# Patient Record
Sex: MALE | Race: WHITE | NOT HISPANIC OR LATINO | URBAN - NONMETROPOLITAN AREA
[De-identification: names, ages, dates, MRNs, and addresses within clinical notes are randomized per-mention and may not be internally consistent; named-entity substitution may affect disease eponyms.]

---

## 2019-05-01 ENCOUNTER — OFFICE VISIT CONVERTED (OUTPATIENT)
Dept: FAMILY MEDICINE CLINIC | Age: 19
End: 2019-05-01
Attending: NURSE PRACTITIONER

## 2019-07-10 ENCOUNTER — OFFICE VISIT CONVERTED (OUTPATIENT)
Dept: FAMILY MEDICINE CLINIC | Age: 19
End: 2019-07-10
Attending: NURSE PRACTITIONER

## 2020-01-18 ENCOUNTER — OFFICE VISIT CONVERTED (OUTPATIENT)
Dept: FAMILY MEDICINE CLINIC | Age: 20
End: 2020-01-18
Attending: NURSE PRACTITIONER

## 2020-08-10 ENCOUNTER — OFFICE VISIT CONVERTED (OUTPATIENT)
Dept: FAMILY MEDICINE CLINIC | Age: 20
End: 2020-08-10
Attending: NURSE PRACTITIONER

## 2021-05-18 NOTE — PROGRESS NOTES
Pedro Wright 2000     Office/Outpatient Visit    Visit Date: Wed, Jul 10, 2019 02:05 pm    Provider: Gissell Carson N.P. (Assistant: Teena Ceballos MA)    Location: [facility]        Electronically signed by Gissell Carson N.P. on  07/10/2019 04:01:38 PM                             SUBJECTIVE:        CC:     Ryan is a 19 year old White male.  This is a follow-up visit.  lexapro not working         HPI:         Patient to be evaluated for anxiety.  Current treatment includes Lexapro.  He is off from College for the summer.  Has been on Lexapro since May, recently increased form 10 to 20 and has not seen a difference.  He has two brothers.  Had not issues in high school but in college trouble with talking and socializing with friends.  Does not walk to try counseling.  He does some volunteer work with animals.  Does not get exercise.  Just not having interest in anything.       ROS:     CONSTITUTIONAL:  Negative for chills, fatigue, fever, and weight change.      CARDIOVASCULAR:  Negative for chest pain, palpitations, tachycardia, orthopnea, and edema.      RESPIRATORY:  Negative for cough, dyspnea, and hemoptysis.      NEUROLOGICAL:  Negative for dizziness, headaches, paresthesias, and weakness.      PSYCHIATRIC:  Negative for sleep disturbance and suicidal thoughts.          PMH/FMH/SH:     Last Reviewed on 7/10/2019 02:28 PM by Gissell Carson    Past Medical History:             PAST MEDICAL HISTORY     UNREMARKABLE     Hospitalizations: Never         Surgical History:         implants for flat feet 10/4/2014;         Family History:     Father: Hypertension;  Type 2 Diabetes     Mother: Hypertension     Brother(s): Healthy; 2 brother(s) total     Paternal Grandfather:  at age 71;  COPD     Paternal Grandmother: Cause of death was 58;  COPD;  Renal Failure     Maternal Grandfather: Sudden Cardiac Death (  at 48 yrs ); cardiomyopathy     Maternal Grandmother: Healthy          Social History:         Currently attends College. 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              Immunizations:     DTaP 1999     DTaP 1999     DTaP 9/12/2000     DTaP 1999     DTaP 3/20/2003     Hib HbOC (4-dose) 1999     Hib HbOC (4-dose) 9/12/2000     Hib HbOC (4-dose) 1999     Comvax (Hib-HepB) 1999     Hep B (pedi/adol, 3-dose schedule) 1999     Hep B (pedi/adol, 3-dose schedule) 3/14/2000     Pfizer MenB vs. Havrix 2/7/2013     Pfizer MenB vs. Havrix 3/25/2013     Pfizer MenB vs. Havrix 7/11/2013     IPV  Poliovirus, inactivated 1999     IPV  Poliovirus, inactivated 1999     IPV  Poliovirus, inactivated 3/14/2000     IPV  Poliovirus, inactivated 3/20/2003     MMR  (Measles-Mumps-Rubella), live 6/19/2000     MMR  (Measles-Mumps-Rubella), live 3/20/2003     Varicella, live 3/14/2000     Varicella, live 7/8/2010     Prevnar (Pneumococcal PCV 7) 6/19/2000     Prevnar (Pneumococcal PCV 7) 9/12/2000     Fluzone (3 + years dose) 10/10/2012     Influenza, split virus (3+ years dose) 10/31/2011     FluMist 12/7/2010     Flumist Quadrivalent 10/17/2013     Influenza A (H1N1) NASAL, Monovalent Live 11/4/2009     Menactra (Meningococcal MCV4P) 7/8/2010     Tdap (Tetanus, reduced diph, acellular pertussis) 7/8/2010         Allergies:     Last Reviewed on 7/10/2019 02:08 PM by Teena Ceballos      No Known Drug Allergies.         Current Medications:     Last Reviewed on 7/10/2019 02:08 PM by Teena Ceballos    Lexapro 20mg Tablet 1 tab daily         OBJECTIVE:        Vitals:         Current: 7/10/2019 2:09:41 PM    Ht:  5 ft, 11 in;  Wt: 195.2 lbs;  BMI: 27.2    T: 98.6 F (oral);  BP: 122/69 mm Hg (left arm, sitting);  P: 62 bpm (left arm (BP Cuff), sitting)        Exams:      PHYSICAL EXAM:     GENERAL: Vitals recorded well developed, well nourished;  well groomed;  no apparent distress;     RESPIRATORY: normal respiratory rate and pattern with no distress; normal breath sounds with no rales, rhonchi, wheezes or rubs;     CARDIOVASCULAR: normal rate; rhythm is regular;  no systolic murmur;     PSYCHIATRIC:  appropriate affect and demeanor; normal speech pattern; grossly normal memory;         ASSESSMENT           300.02   F41.9  Anxiety              DDx:         ORDERS:         Meds Prescribed:       Wellbutrin XL (Bupropion HCl)  150mg Tablets, Extended Release 1 tab daily  #30 (Thirty) tablet(s) Refills: 1                 PLAN:          Anxiety he starts back to school on August 26, recommend he continue lexapro for the next  2 weeks and he still feels no different to add wellbutrin  mg; recommend regular exercise and counseling (consider stopping lexapro if not improving )         FOLLOW-UP: Advised to call if there is no improvement in 4 week(s).            Prescriptions:       Wellbutrin XL (Bupropion HCl)  150mg Tablets, Extended Release 1 tab daily  #30 (Thirty) tablet(s) Refills: 1             CHARGE CAPTURE           **Please note: ICD descriptions below are intended for billing purposes only and may not represent clinical diagnoses**        Primary Diagnosis:         300.02 Anxiety            F41.9    Anxiety disorder, unspecified              Orders:          09190   Office/outpatient visit; established patient, level 3  (In-House)

## 2021-05-18 NOTE — PROGRESS NOTES
Pedro Wright 2000     Office/Outpatient Visit    Visit Date: Wed, May 1, 2019 03:18 pm    Provider: Gissell Carson N.P. (Assistant: Deja Brooks RN)    Location: [facility]        Electronically signed by Gissell Carson N.P. on  05/01/2019 04:54:42 PM                             SUBJECTIVE:        CC:     Ryan is a 19 year old White male.  This is his first visit to the clinic.  Establish New PCP         HPI:         PHQ-9 Depression Screening: Completed form scanned and in chart; Total Score 3 Alcohol Consumption Screening: Completed form scanned and in chart; Total Score 0         In regard to the anxiety, he has suggestive symptoms but does not currently carry an official diagnosis of anxiety disorder.  His symptom complex includes worrier.  The frequency symptoms is several times per day.  Apparent triggers include public speaking and crowds or public places.  He has had no prior treatment for anxiety.  Family history is pertinent for anxiety in his mother.  He says he first noticed that symptoms were a problem when he went through puberty.  He lives away at college and does well at school.     ROS:     CONSTITUTIONAL:  Negative for chills, fatigue, fever, and weight change.      CARDIOVASCULAR:  Negative for chest pain, palpitations, tachycardia, orthopnea, and edema.      RESPIRATORY:  Negative for cough, dyspnea, and hemoptysis.      NEUROLOGICAL:  Negative for dizziness, headaches, paresthesias, and weakness.      PSYCHIATRIC:  Positive for insomnia.   Negative for suicidal thoughts.  sleep issues sometimes         PMH/FMH/SH:     Last Reviewed on 5/01/2019 03:37 PM by Gissell Carson    Past Medical History:             PAST MEDICAL HISTORY     UNREMARKABLE     Hospitalizations: Never         Surgical History:         implants for flat feet 10/4/2014;         Family History:     Father: Hypertension;  Type 2 Diabetes     Mother: Hypertension     Brother(s): Healthy; 2  brother(s) total     Paternal Grandfather:  at age 71;  COPD     Paternal Grandmother: Cause of death was 58;  COPD;  Renal Failure     Maternal Grandfather: Sudden Cardiac Death (  at 48 yrs ); cardiomyopathy     Maternal Grandmother: Healthy         Social History:         Currently attends College. 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              Immunizations:     DTaP 1999     DTaP 1999     DTaP 2000     DTaP 1999     DTaP 3/20/2003     Hib HbOC (4-dose) 1999     Hib HbOC (4-dose) 2000     Hib HbOC (4-dose) 1999     Comvax (Hib-HepB) 1999     Hep B (pedi/adol, 3-dose schedule) 1999     Hep B (pedi/adol, 3-dose schedule) 3/14/2000     Pfizer MenB vs. Havrix 2013     Pfizer MenB vs. Havrix 3/25/2013     Pfizer MenB vs. Havrix 2013     IPV  Poliovirus, inactivated 1999     IPV  Poliovirus, inactivated 1999     IPV  Poliovirus, inactivated 3/14/2000     IPV  Poliovirus, inactivated 3/20/2003     MMR  (Measles-Mumps-Rubella), live 2000     MMR  (Measles-Mumps-Rubella), live 3/20/2003     Varicella, live 3/14/2000     Varicella, live 2010     Prevnar (Pneumococcal PCV 7) 2000     Prevnar (Pneumococcal PCV 7) 2000     Fluzone (3 + years dose) 10/10/2012     Influenza, split virus (3+ years dose) 10/31/2011     FluMist 2010     Flumist Quadrivalent 10/17/2013     Influenza A (H1N1) NASAL, Monovalent Live 2009     Menactra (Meningococcal MCV4P) 2010     Tdap (Tetanus, reduced diph, acellular pertussis) 2010         Allergies:     Last Reviewed on 2019 03:21 PM by Deja Brooks      No Known Drug Allergies.         Current Medications:     Last Reviewed on 2019 03:21 PM by Deja Brooks    None        OBJECTIVE:         Vitals:         Current: 5/1/2019 3:24:36 PM    Ht:  5 ft, 11 in;  Wt: 196 lbs;  BMI: 27.3    T: 99 F (oral);  BP: 138/75 mm Hg (left arm, sitting);  P: 73 bpm (left arm (BP Cuff), sitting)        Repeat:     4:03:44 PM     BP:   137/90mm Hg (left arm, sitting)         Exams:     PHYSICAL EXAM:     GENERAL: Vitals recorded well developed, well nourished;  well groomed;  no apparent distress;     NECK: carotid exam reveals no bruits;     RESPIRATORY: normal respiratory rate and pattern with no distress; normal breath sounds with no rales, rhonchi, wheezes or rubs;     CARDIOVASCULAR: normal rate; rhythm is regular;  no systolic murmur; no edema;     PSYCHIATRIC: affect/demeanor: anxious;         ASSESSMENT           V79.0   Z13.89  Screening for depression              DDx:     300.02   F41.9  Anxiety              DDx:         ORDERS:         Meds Prescribed:       Lexapro (Escitalopram Oxalate) 10mg Tablet 1 tab daily  #30 (Thirty) tablet(s) Refills: 1         Other Orders:         Depression screen negative  (In-House)           Negative EtOH screen  (In-House)                   PLAN:          Screening for depression     MIPS PHQ-9 Depression Screening Completed form scanned and in chart; Total Score 3 Negative alcohol screen           Orders:         Depression screen negative  (In-House)           Negative EtOH screen  (In-House)            Anxiety discussion/ trial of lexapro         RECOMMENDATIONS given include: stress reduction and counseling.      FOLLOW-UP: Advised to call if there is no improvement in 3-4 week(s).            Prescriptions:       Lexapro (Escitalopram Oxalate) 10mg Tablet 1 tab daily  #30 (Thirty) tablet(s) Refills: 1             Patient Recommendations:        For  Anxiety:     Try stress reduction methods to reduce the frequency or lessen the severity of anxiety episodes.              CHARGE CAPTURE           **Please note: ICD descriptions below are intended for  billing purposes only and may not represent clinical diagnoses**        Primary Diagnosis:         V79.0 Screening for depression            Z13.89    Encounter for screening for other disorder              Orders:          42588   Office visit - new pt, level 2  (In-House)                Depression screen negative  (In-House)                Negative EtOH screen  (In-House)           300.02 Anxiety            F41.9    Anxiety disorder, unspecified

## 2021-05-18 NOTE — PROGRESS NOTES
Pedro Wright  2000     Office/Outpatient Visit    Visit Date: Mon, Aug 10, 2020 02:54 pm    Provider: Gissell Carson N.P. (Assistant: Lady Marie MA)    Location: [facility]        Electronically signed by Gissell Carson N.P. on  08/10/2020 05:08:54 PM                             Subjective:        CC: Ryan is a 20 year old White male.  Patient is here today to discuss doing online classes oppose to in person due to covid 19         HPI:           Patient to be evaluated for anxiety disorder, unspecified.  Family history is pertinent for heart valve disorder in brother, he has an upcoing heart surgery.  Not on any medication, felt like rx did not help.  Is talking to a new girl, that makes him more anxious.   Not working. Worry about pressure to go back to school to the classroom, wants to do on line school.      ROS:     CONSTITUTIONAL:  Negative for fever.      CARDIOVASCULAR:  Negative for chest pain.      RESPIRATORY:  Negative for recent cough and dyspnea.  history of asthma     GASTROINTESTINAL:  Negative for abdominal pain, heartburn, constipation, diarrhea, and stool changes.      NEUROLOGICAL:  Negative for dizziness, headaches, paresthesias, and weakness.          Past Medical History / Family History / Social History:         Last Reviewed on 8/10/2020 03:03 PM by Gissell Carson    Past Medical History:             PAST MEDICAL HISTORY     UNREMARKABLE     Hospitalizations: Never         Surgical History:         implants for flat feet 10/4/2014;         Family History:     Father: Hypertension;  Type 2 Diabetes     Mother: Hypertension     Brother(s): 2 brother(s) total;  valve disorder, may need heart surgery     Paternal Grandfather:  at age 71;  COPD     Paternal Grandmother: Cause of death was 58;  COPD;  Renal Failure     Maternal Grandfather: Sudden Cardiac Death (  at 48 yrs ); cardiomyopathy     Maternal Grandmother: Healthy         Social History:          Currently attends College. 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          Immunizations:     DTaP 1999    DTaP 1999    DTaP 9/12/2000    DTaP 1999    DTaP 3/20/2003    Hib HbOC (4-dose) 1999    Hib HbOC (4-dose) 9/12/2000    Hib HbOC (4-dose) 1999    Comvax (Hib-HepB) 1999    Hep B (pedi/adol, 3-dose schedule) 1999    Hep B (pedi/adol, 3-dose schedule) 3/14/2000    Pfizer MenB vs. Havrix 2/7/2013    Pfizer MenB vs. Havrix 3/25/2013    Pfizer MenB vs. Havrix 7/11/2013    IPV  Poliovirus, inactivated 1999    IPV  Poliovirus, inactivated 1999    IPV  Poliovirus, inactivated 3/14/2000    IPV  Poliovirus, inactivated 3/20/2003    MMR  (Measles-Mumps-Rubella), live 6/19/2000    MMR  (Measles-Mumps-Rubella), live 3/20/2003    Varicella, live 3/14/2000    Varicella, live 7/8/2010    Prevnar (Pneumococcal PCV 7) 6/19/2000    Prevnar (Pneumococcal PCV 7) 9/12/2000    Fluzone (3 + years dose) 10/10/2012    Influenza, split virus (3+ years dose) 10/31/2011    FluMist 12/7/2010    Flumist Quadrivalent 10/17/2013    Influenza A (H1N1) NASAL, Monovalent Live 11/4/2009    Menactra (Meningococcal MCV4P) 7/8/2010    Tdap (Tetanus, reduced diph, acellular pertussis) 7/8/2010        Allergies:     Last Reviewed on 8/10/2020 02:55 PM by Lady Marie    No Known Allergies.        Current Medications:     Last Reviewed on 8/10/2020 02:55 PM by Lady Marie    None        Objective:        Vitals:         Current: 8/10/2020 2:57:51 PM    Ht:  5 ft, 11 in;  Wt: 215.4 lbs;  BMI: 30.0T: 98.2 F (temporal);  BP: 143/77 mm Hg (right arm, sitting);  P: 84 bpm (right arm (BP Cuff), sitting)        Exams:     PHYSICAL EXAM:     GENERAL: Vitals recorded well developed, well nourished;  well groomed;  no  apparent distress;     NECK: carotid exam reveals no bruits;     RESPIRATORY: normal respiratory rate and pattern with no distress; normal breath sounds with no rales, rhonchi, wheezes or rubs;     CARDIOVASCULAR: normal rate; rhythm is regular;  no systolic murmur; no edema;     PSYCHIATRIC: affect/demeanor: anxious;         Assessment:         F41.9   Anxiety disorder, unspecified       J45.909   Unspecified asthma, uncomplicated       Z82.49   Family history of ischemic heart disease and other diseases of the circulatory system           Plan:         Anxiety disorder, unspecifieddeclines treatment at this time         RECOMMENDATIONS given include: stress reduction.      FOLLOW-UP: Advised to call if there is no improvement in 4 week(s).          Unspecified asthma, uncomplicatedI advise at this time for him to do on line classes        Family history of ischemic heart disease and other diseases of the circulatory systemdue to his brother's heart condition, exposure to COVID would not be advised             Patient Recommendations:        For  Anxiety disorder, unspecified:    Try stress reduction methods to reduce the frequency or lessen the severity of anxiety episodes.              Charge Capture:         Primary Diagnosis:     F41.9  Anxiety disorder, unspecified           Orders:      40622  Office/outpatient visit; established patient, level 4  (In-House)              J45.909  Unspecified asthma, uncomplicated     Z82.49  Family history of ischemic heart disease and other diseases of the circulatory system

## 2021-05-18 NOTE — PROGRESS NOTES
Pedro Wright  2000     Office/Outpatient Visit    Visit Date: Sat, 2020 09:21 am    Provider: Gissell Carson N.P. (Assistant: Marisol Brewer, )    Location: [facility]        Electronically signed by Gissell Carson N.P. on  2020 11:19:50 AM                             Subjective:        CC: Ryan is a 19 year old White male.  This is a follow-up visit.  med refills         HPI:           Ryan presents with anxiety disorder, unspecified.  Apparent triggers include public speaking and being around people.  Current treatment includes Wellbutrin XL.  he was on lexapro, but never took the two together for more than 5-7 days, can't tell that either rx have helped him, he is in college, does okay at school, lives on campus, but is not social     ROS:     CONSTITUTIONAL:  Negative for chills, fatigue, fever, and weight change.      CARDIOVASCULAR:  Negative for chest pain, palpitations, tachycardia, orthopnea, and edema.      RESPIRATORY:  Negative for cough, dyspnea, and hemoptysis.      NEUROLOGICAL:  Negative for dizziness, headaches, paresthesias, and weakness.      PSYCHIATRIC:  Negative for sleep disturbance and suicidal thoughts.          Past Medical History / Family History / Social History:         Last Reviewed on 2020 09:28 AM by Gissell Carson    Past Medical History:             PAST MEDICAL HISTORY     UNREMARKABLE     Hospitalizations: Never         Surgical History:         implants for flat feet 10/4/2014;         Family History:     Father: Hypertension;  Type 2 Diabetes     Mother: Hypertension     Brother(s): Healthy; 2 brother(s) total     Paternal Grandfather:  at age 71;  COPD     Paternal Grandmother: Cause of death was 58;  COPD;  Renal Failure     Maternal Grandfather: Sudden Cardiac Death (  at 48 yrs ); cardiomyopathy     Maternal Grandmother: Healthy         Social History:         Currently attends College. ( [employer];   ; field of study is Zoology )     Marital Status: Single     Children: None         Tobacco/Alcohol/Supplements:     Last Reviewed on 1/18/2020 09:24 AM by Marisol Brewer    Tobacco: He has never smoked.          Immunizations:     DTaP 1999    DTaP 1999    DTaP 9/12/2000    DTaP 1999    DTaP 3/20/2003    Hib HbOC (4-dose) 1999    Hib HbOC (4-dose) 9/12/2000    Hib HbOC (4-dose) 1999    Comvax (Hib-HepB) 1999    Hep B (pedi/adol, 3-dose schedule) 1999    Hep B (pedi/adol, 3-dose schedule) 3/14/2000    Pfizer MenB vs. Havrix 2/7/2013    Pfizer MenB vs. Havrix 3/25/2013    Pfizer MenB vs. Havrix 7/11/2013    IPV  Poliovirus, inactivated 1999    IPV  Poliovirus, inactivated 1999    IPV  Poliovirus, inactivated 3/14/2000    IPV  Poliovirus, inactivated 3/20/2003    MMR  (Measles-Mumps-Rubella), live 6/19/2000    MMR  (Measles-Mumps-Rubella), live 3/20/2003    Varicella, live 3/14/2000    Varicella, live 7/8/2010    Prevnar (Pneumococcal PCV 7) 6/19/2000    Prevnar (Pneumococcal PCV 7) 9/12/2000    Fluzone (3 + years dose) 10/10/2012    Influenza, split virus (3+ years dose) 10/31/2011    FluMist 12/7/2010    Flumist Quadrivalent 10/17/2013    Influenza A (H1N1) NASAL, Monovalent Live 11/4/2009    Menactra (Meningococcal MCV4P) 7/8/2010    Tdap (Tetanus, reduced diph, acellular pertussis) 7/8/2010        Allergies:     Last Reviewed on 1/18/2020 09:24 AM by Marisol Brewer    No Known Allergies.        Current Medications:     Last Reviewed on 1/18/2020 09:24 AM by Marisol Brewer    buPROPion  mg oral Tablet, Extended Release 24 hr [TAKE 1 TABLET BY MOUTH ONCE DAILY]        Objective:        Vitals:         Current: 1/18/2020 9:26:11 AM    Ht:  5 ft, 11 in;  Wt: 217.4 lbs;  BMI: 30.3T: 97.8 F (oral);  BP: 137/81 mm Hg (left arm, sitting);  P: 70 bpm (left arm (BP Cuff), sitting)        Exams:     PHYSICAL EXAM:     GENERAL: Vitals recorded well  developed, well nourished;  well groomed;  no apparent distress;     RESPIRATORY: normal respiratory rate and pattern with no distress; normal breath sounds with no rales, rhonchi, wheezes or rubs;     CARDIOVASCULAR: normal rate; rhythm is regular;  no systolic murmur;     PSYCHIATRIC:  appropriate affect and demeanor; normal speech pattern; grossly normal memory;         Assessment:         F41.9   Anxiety disorder, unspecified           ORDERS:         Meds Prescribed:       [Refilled] buPROPion  mg oral Tablet, Extended Release 24 hr [TAKE 1 TABLET BY MOUTH ONCE DAILY], #90 (ninety) each, Refills: 0 (zero)       [New Rx] Lexapro 10 mg oral tablet [take 1 tablet (10 mg) by oral route once daily], #90 (ninety) tablets, Refills: 0 (zero)                 Plan:         Anxiety disorder, unspecifiedcontinue wellbutrin, restart lexapro, see if the combination help over the next several weeks, recommend counseling         RECOMMENDATIONS given include: stress reduction and join gym again at school, check  into counseling at school.      FOLLOW-UP: Advised to call if there is no improvement in 6-8 weeks week(s).            Prescriptions:       [Refilled] buPROPion  mg oral Tablet, Extended Release 24 hr [TAKE 1 TABLET BY MOUTH ONCE DAILY], #90 (ninety) each, Refills: 0 (zero)       [New Rx] Lexapro 10 mg oral tablet [take 1 tablet (10 mg) by oral route once daily], #90 (ninety) tablets, Refills: 0 (zero)             Patient Recommendations:        For  Anxiety disorder, unspecified:    Try stress reduction methods to reduce the frequency or lessen the severity of anxiety episodes.              Charge Capture:         Primary Diagnosis:     F41.9  Anxiety disorder, unspecified           Orders:      [id number]  Office/outpatient visit; established patient, level 3  (In-House)

## 2021-07-01 VITALS
HEART RATE: 62 BPM | BODY MASS INDEX: 27.33 KG/M2 | SYSTOLIC BLOOD PRESSURE: 122 MMHG | HEIGHT: 71 IN | TEMPERATURE: 98.6 F | DIASTOLIC BLOOD PRESSURE: 69 MMHG | WEIGHT: 195.2 LBS

## 2021-07-01 VITALS
BODY MASS INDEX: 27.44 KG/M2 | HEIGHT: 71 IN | WEIGHT: 196 LBS | DIASTOLIC BLOOD PRESSURE: 90 MMHG | HEART RATE: 73 BPM | TEMPERATURE: 99 F | SYSTOLIC BLOOD PRESSURE: 137 MMHG

## 2021-07-02 VITALS
TEMPERATURE: 97.8 F | DIASTOLIC BLOOD PRESSURE: 81 MMHG | HEIGHT: 71 IN | WEIGHT: 217.4 LBS | SYSTOLIC BLOOD PRESSURE: 137 MMHG | HEART RATE: 70 BPM | BODY MASS INDEX: 30.44 KG/M2

## 2021-07-02 VITALS
WEIGHT: 215.4 LBS | HEIGHT: 71 IN | SYSTOLIC BLOOD PRESSURE: 143 MMHG | BODY MASS INDEX: 30.15 KG/M2 | DIASTOLIC BLOOD PRESSURE: 77 MMHG | HEART RATE: 84 BPM | TEMPERATURE: 98.2 F